# Patient Record
Sex: MALE | URBAN - METROPOLITAN AREA
[De-identification: names, ages, dates, MRNs, and addresses within clinical notes are randomized per-mention and may not be internally consistent; named-entity substitution may affect disease eponyms.]

---

## 2020-10-27 ENCOUNTER — NEW PATIENT (OUTPATIENT)
Dept: URBAN - METROPOLITAN AREA CLINIC 56 | Facility: CLINIC | Age: 80
End: 2020-10-27
Payer: MEDICARE

## 2020-10-27 DIAGNOSIS — H40.1210: ICD-10-CM

## 2020-10-27 DIAGNOSIS — H40.022 OPEN ANGLE WITH BORDERLINE FINDINGS, HIGH RISK, LEFT EYE: ICD-10-CM

## 2020-10-27 DIAGNOSIS — H40.1214 LOW-TENSION GLAUCOMA, RIGHT EYE, INDETERMINATE STAGE: ICD-10-CM

## 2020-10-27 PROCEDURE — 92250 FUNDUS PHOTOGRAPHY W/I&R: CPT | Performed by: OPTOMETRIST

## 2020-10-27 PROCEDURE — 92004 COMPRE OPH EXAM NEW PT 1/>: CPT | Performed by: OPTOMETRIST

## 2020-10-27 PROCEDURE — 92134 CPTRZ OPH DX IMG PST SGM RTA: CPT | Performed by: OPTOMETRIST

## 2020-10-27 ASSESSMENT — VISUAL ACUITY
OS: 20/40
OD: 20/70

## 2020-10-27 ASSESSMENT — KERATOMETRY
OS: 43.75
OD: 43.24

## 2020-10-27 ASSESSMENT — INTRAOCULAR PRESSURE
OD: 16
OS: 17

## 2020-12-02 ENCOUNTER — FOLLOW UP ESTABLISHED (OUTPATIENT)
Dept: URBAN - METROPOLITAN AREA CLINIC 56 | Facility: CLINIC | Age: 80
End: 2020-12-02
Payer: MEDICARE

## 2020-12-02 DIAGNOSIS — H25.813 COMBINED FORMS OF AGE-RELATED CATARACT, BILATERAL: ICD-10-CM

## 2020-12-02 DIAGNOSIS — H40.1234 LOW-TENSION GLAUCOMA, BILATERAL, INDETERMINATE STAGE: Primary | ICD-10-CM

## 2020-12-02 PROCEDURE — 92083 EXTENDED VISUAL FIELD XM: CPT | Performed by: OPTOMETRIST

## 2020-12-02 PROCEDURE — 92012 INTRM OPH EXAM EST PATIENT: CPT | Performed by: OPTOMETRIST

## 2020-12-02 PROCEDURE — 76514 ECHO EXAM OF EYE THICKNESS: CPT | Performed by: OPTOMETRIST

## 2020-12-02 RX ORDER — LATANOPROST 50 UG/ML
0.005 % SOLUTION OPHTHALMIC
Qty: 7.5 | Refills: 3 | Status: ACTIVE
Start: 2020-12-02

## 2020-12-02 ASSESSMENT — INTRAOCULAR PRESSURE
OS: 16
OD: 16

## 2021-01-15 ENCOUNTER — Encounter (OUTPATIENT)
Dept: URBAN - METROPOLITAN AREA CLINIC 56 | Facility: CLINIC | Age: 81
End: 2021-01-15
Payer: MEDICARE

## 2021-01-15 DIAGNOSIS — Z01.818 ENCOUNTER FOR OTHER PREPROCEDURAL EXAMINATION: Primary | ICD-10-CM

## 2021-01-15 PROCEDURE — 99203 OFFICE O/P NEW LOW 30 MIN: CPT | Performed by: PHYSICIAN ASSISTANT

## 2022-05-02 ENCOUNTER — OFFICE VISIT (OUTPATIENT)
Dept: URBAN - METROPOLITAN AREA CLINIC 56 | Facility: CLINIC | Age: 82
End: 2022-05-02
Payer: MEDICARE

## 2022-05-02 DIAGNOSIS — H25.813 COMBINED FORMS OF AGE-RELATED CATARACT, BILATERAL: Primary | ICD-10-CM

## 2022-05-02 DIAGNOSIS — H40.1234 LOW-TENSION GLAUCOMA, BILATERAL, INDETERMINATE STAGE: ICD-10-CM

## 2022-05-02 PROCEDURE — 99214 OFFICE O/P EST MOD 30 MIN: CPT | Performed by: STUDENT IN AN ORGANIZED HEALTH CARE EDUCATION/TRAINING PROGRAM

## 2022-05-02 PROCEDURE — 92133 CPTRZD OPH DX IMG PST SGM ON: CPT | Performed by: STUDENT IN AN ORGANIZED HEALTH CARE EDUCATION/TRAINING PROGRAM

## 2022-05-02 RX ORDER — TIMOLOL MALEATE 6.8 MG/ML
0.5 % SOLUTION/ DROPS OPHTHALMIC
Qty: 5 | Refills: 3 | Status: INACTIVE
Start: 2022-05-02 | End: 2022-05-02

## 2022-05-02 ASSESSMENT — KERATOMETRY
OS: 43.94
OD: 43.55

## 2022-05-02 ASSESSMENT — INTRAOCULAR PRESSURE
OS: 11
OD: 13

## 2022-05-02 NOTE — IMPRESSION/PLAN
Impression: Combined forms of age-related cataract, bilateral: H25.813. Plan: visually significant with expected improvement in vision with phaco/IOL. Discussed r/b/a of procedure. All surgical options discussed, including LensX vs conventional, and multifocal IOLs. Patient accepts full time glasses after surgery if conventional is elected, and glasses for NEAR if LensX/toric is elected. NO PANOPTIX D/T GLC. All questions answered. Refer patient for cataract pre-op. First Eye: OD Target: Distance Dilation: Good Habitual spec wear: (-) h/o trauma (-) h/o tamsulosin PT HAS DEMENTIA, BUT WAS ABLE TO SIT BEHIND SLIT LAMP AND FOLLOW COMMANDS. I BELIEVE HE WILL BE ABLE TO HAVE STANDARD PHACO, BUT DISCUSSED POSSBILITY OF SEDATED SX AT [facility].  NO HVF PRE OP D/T H/O POOR FIELDS

## 2022-05-02 NOTE — IMPRESSION/PLAN
Impression: Low-tension glaucoma, bilateral, indeterminate stage Pachs: [id number]/785 LRNFL (05/2022): inf thinning OD, full OS

HVF24-2 (12/2020) Plan: low IOP with Tonopen, difficult to check given pt compliance and deep set eyes. RNFL shows inf thinning OD only, full OS - possible vascular event but no retinal signs. Prev discussed phaco with MIGS to improve vision and compliance. Start Timolol QAM OD only RTC for pre op phaco with MIGS - HVF UNRELIABLE, NO NEED TO REPEPAT PRIOR TO PHACO.

## 2022-07-15 ENCOUNTER — ADULT PHYSICAL (OUTPATIENT)
Dept: URBAN - METROPOLITAN AREA CLINIC 56 | Facility: CLINIC | Age: 82
End: 2022-07-15
Payer: MEDICARE

## 2022-07-15 DIAGNOSIS — Z01.818 ENCOUNTER FOR OTHER PREPROCEDURAL EXAMINATION: Primary | ICD-10-CM

## 2022-07-15 DIAGNOSIS — H25.813 COMBINED FORMS OF AGE-RELATED CATARACT, BILATERAL: ICD-10-CM

## 2022-07-15 PROCEDURE — 99213 OFFICE O/P EST LOW 20 MIN: CPT | Performed by: PHYSICIAN ASSISTANT

## 2022-07-19 ENCOUNTER — OFFICE VISIT (OUTPATIENT)
Dept: URBAN - METROPOLITAN AREA CLINIC 56 | Facility: CLINIC | Age: 82
End: 2022-07-19
Payer: MEDICARE

## 2022-07-19 DIAGNOSIS — H25.813 COMBINED FORMS OF AGE-RELATED CATARACT, BILATERAL: Primary | ICD-10-CM

## 2022-07-19 DIAGNOSIS — H40.1134 PRIMARY OPEN-ANGLE GLAUCOMA, INDETERMINATE, BILATERAL: ICD-10-CM

## 2022-07-19 DIAGNOSIS — H52.203 BILATERAL ASTIGMATISM: ICD-10-CM

## 2022-07-19 DIAGNOSIS — H25.812 COMBINED FORMS OF AGE-RELATED CATARACT, LEFT EYE: ICD-10-CM

## 2022-07-19 PROCEDURE — 99204 OFFICE O/P NEW MOD 45 MIN: CPT | Performed by: OPHTHALMOLOGY

## 2022-07-19 PROCEDURE — 92020 GONIOSCOPY: CPT | Performed by: OPHTHALMOLOGY

## 2022-07-19 PROCEDURE — 92083 EXTENDED VISUAL FIELD XM: CPT | Performed by: OPHTHALMOLOGY

## 2022-07-19 RX ORDER — TIMOLOL MALEATE 5 MG/ML
0.5 % SOLUTION/ DROPS OPHTHALMIC
Qty: 5 | Refills: 5 | Status: ACTIVE
Start: 2022-07-19

## 2022-07-19 RX ORDER — KETOROLAC TROMETHAMINE 5 MG/ML
0.5 % SOLUTION OPHTHALMIC
Qty: 10 | Refills: 1 | Status: ACTIVE
Start: 2022-07-19

## 2022-07-19 RX ORDER — OFLOXACIN 3 MG/ML
0.3 % SOLUTION/ DROPS OPHTHALMIC
Qty: 5 | Refills: 1 | Status: ACTIVE
Start: 2022-07-19

## 2022-07-19 RX ORDER — PREDNISOLONE ACETATE 10 MG/ML
1 % SUSPENSION/ DROPS OPHTHALMIC
Qty: 10 | Refills: 1 | Status: ACTIVE
Start: 2022-07-19

## 2022-07-19 ASSESSMENT — KERATOMETRY
OD: 43.44
OS: 43.58

## 2022-07-19 ASSESSMENT — INTRAOCULAR PRESSURE
OD: 18
OS: 17

## 2022-07-19 ASSESSMENT — VISUAL ACUITY
OS: 20/30
OD: 20/60

## 2022-07-19 NOTE — IMPRESSION/PLAN
Impression: Combined forms of age-related cataract, bilateral: H25.813. Plan: See [address]: CE/Phaco/IOL/[place] Dual Blade Goniotomy OU, OD 1st eye, [[AIM: -0.25 OU; Standard Lens; NO LRI/ORA; No Trimoxi/Dexycu; Use Oflox/Prednisolone/Ket QID OU]] **WILL NEED EPISHUGARCAINE (tamsulosin)**
-ERx'd Ofloxacin QID, Prednisolone QID, and Ketorolac QID as requested. 
-Continue Glaucoma Drops: CHANGE Timolol QAM from OD to OU.

## 2022-07-19 NOTE — IMPRESSION/PLAN
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 Plan: Discussed cataract and glaucoma diagnosis in detail with patient. Phaco/IOL, [place] Dual Blade Goniotomy surgery was discussed in detail. Discussed risks, benefits, and expectations of cataract surgery. Patient also understands glasses will be necessary for sharpest vision after surgery. Although surgery is expected to improve the condition, the patient understands that there is a chance that the condition could worsen in the future. Patient advised that ongoing uncontrolled glaucoma may result in permanent vision loss. Discussed surgery in detail with patient. Post-Operative instructions reviewed with patient. Discussed activity restrictions including no bending head below waist, rubbing the eye, straining or lifting over 10 lbs, stay out of [place], dirty areas and shield at night for one week. All questions answered. Patient understands condition may limit possible outcome of surgery.

## 2022-07-19 NOTE — IMPRESSION/PLAN
Impression: Bilateral astigmatism: H52.203. Plan: No AMP. Patient understands condition may limit possible outcome of surgery.

## 2022-07-28 ENCOUNTER — SURGERY (OUTPATIENT)
Dept: URBAN - METROPOLITAN AREA SURGERY 19 | Facility: SURGERY | Age: 82
End: 2022-07-28
Payer: MEDICARE

## 2022-07-28 DIAGNOSIS — H25.813 COMBINED FORMS OF AGE-RELATED CATARACT, BILATERAL: Primary | ICD-10-CM

## 2022-07-28 DIAGNOSIS — H40.1134 PRIMARY OPEN-ANGLE GLAUCOMA, BILATERAL, INDETERMINATE STAGE: ICD-10-CM

## 2022-07-29 ENCOUNTER — POST-OPERATIVE VISIT (OUTPATIENT)
Dept: URBAN - METROPOLITAN AREA CLINIC 56 | Facility: CLINIC | Age: 82
End: 2022-07-29
Payer: MEDICARE

## 2022-07-29 DIAGNOSIS — Z48.810 ENCOUNTER FOR SURGICAL AFTERCARE FOLLOWING SURGERY ON A SENSE ORGAN: Primary | ICD-10-CM

## 2022-07-29 PROCEDURE — 99024 POSTOP FOLLOW-UP VISIT: CPT | Performed by: STUDENT IN AN ORGANIZED HEALTH CARE EDUCATION/TRAINING PROGRAM

## 2022-07-29 ASSESSMENT — INTRAOCULAR PRESSURE: OD: 17

## 2022-07-29 NOTE — IMPRESSION/PLAN
Impression: S/P Cataract Extraction by phacoemulsification with IOL placement; Goniotomy OD - 1 Day. Encounter for surgical aftercare following surgery on a sense organ  Z48.810. Excellent post op course   Post operative instructions reviewed - Plan: good IOP. Restrictions discussed. Call with worsening pain or vision.

## 2022-08-02 ENCOUNTER — POST-OPERATIVE VISIT (OUTPATIENT)
Dept: URBAN - METROPOLITAN AREA CLINIC 56 | Facility: CLINIC | Age: 82
End: 2022-08-02
Payer: MEDICARE

## 2022-08-02 DIAGNOSIS — H52.203 BILATERAL ASTIGMATISM: ICD-10-CM

## 2022-08-02 DIAGNOSIS — Z48.810 ENCOUNTER FOR SURGICAL AFTERCARE FOLLOWING SURGERY ON A SENSE ORGAN: Primary | ICD-10-CM

## 2022-08-02 DIAGNOSIS — H40.1134 PRIMARY OPEN-ANGLE GLAUCOMA, INDETERMINATE, BILATERAL: ICD-10-CM

## 2022-08-02 DIAGNOSIS — H25.812 COMBINED FORMS OF AGE-RELATED CATARACT, LEFT EYE: ICD-10-CM

## 2022-08-02 PROCEDURE — 99024 POSTOP FOLLOW-UP VISIT: CPT | Performed by: OPHTHALMOLOGY

## 2022-08-02 ASSESSMENT — INTRAOCULAR PRESSURE
OS: 18
OD: 16

## 2022-08-02 ASSESSMENT — VISUAL ACUITY: OD: 20/80

## 2022-08-02 NOTE — IMPRESSION/PLAN
Impression: Primary open-angle glaucoma, indeterminate, bilateral: H40.0418. Plan: Discussed cataract and glaucoma diagnosis in detail with patient. Phaco/IOL, [place] Dual Blade Goniotomy surgery for the left eye, was discussed in detail. Discussed risks, benefits, and expectations of cataract surgery. Patient also understands glasses will be necessary for sharpest vision after surgery. Although surgery is expected to improve the condition, the patient understands that there is a chance that the condition could worsen in the future. Patient advised that ongoing uncontrolled glaucoma may result in permanent vision loss. Discussed surgery in detail with patient. Post-Operative instructions reviewed with patient. Discussed activity restrictions including no bending head below waist, rubbing the eye, straining or lifting over 10 lbs, stay out of [place], dirty areas and shield at night for one week. All questions answered. Patient understands condition may limit possible outcome of surgery.

## 2022-08-02 NOTE — IMPRESSION/PLAN
Impression:  Encounter for surgical aftercare following surgery on a sense organ  Z48.810. Plan: POW1 Phaco/IOL OD Difficult to perform exam. Discussed diagnosis and treatment options in detail. Patient may proceed with second eye cataract surgery based on complaint today. Surgical risks and complications were reviewed today and all questions were answered. Patient reminded of need for glasses for best vision after cataract surgery. Recommend patient continue not rubbing surgical eye for 1 month, no swimming or eye makeup for a total of two weeks. Patient advised to find plastic shield; wear for one week when sleep. Patient wishes to proceed with surgery of the second eye.
-Drop schedule: Stop Ofloxacin. Continue Ketorolac QID OD [id number] from surgery. Begin taper of Prednisolone OD to TID x1week then BID x1week then QD x1week then stop.

## 2022-08-02 NOTE — IMPRESSION/PLAN
Impression: Combined forms of age-related cataract, left eye: H25.812. Plan: Patient wishes to proceed with second eye. See [address]: CE/Phaco/IOL/[place] Dual Blade Goniotomy OS 2nd eye, [[AIM: -0.25 OS; Standard Lens; NO LRI/ORA OS; No Trimoxi/Dexycu; Use Oflox/Prednisolone/Ket QID OS]] **WILL NEED EPISHUGARCAINE (tamsulosin)**

-Continue Glaucoma Drops: CHANGE Timolol QAM from OD to OU.

## 2022-08-11 ENCOUNTER — SURGERY (OUTPATIENT)
Dept: URBAN - METROPOLITAN AREA SURGERY 19 | Facility: SURGERY | Age: 82
End: 2022-08-11
Payer: MEDICARE

## 2022-08-11 DIAGNOSIS — H25.812 COMBINED FORMS OF AGE-RELATED CATARACT, LEFT EYE: Primary | ICD-10-CM

## 2022-08-11 DIAGNOSIS — H40.1134 PRIMARY OPEN-ANGLE GLAUCOMA, BILATERAL, INDETERMINATE STAGE: ICD-10-CM

## 2022-08-12 ENCOUNTER — POST-OPERATIVE VISIT (OUTPATIENT)
Dept: URBAN - METROPOLITAN AREA CLINIC 56 | Facility: CLINIC | Age: 82
End: 2022-08-12
Payer: MEDICARE

## 2022-08-12 DIAGNOSIS — Z96.1 PRESENCE OF INTRAOCULAR LENS: Primary | ICD-10-CM

## 2022-08-12 PROCEDURE — 99024 POSTOP FOLLOW-UP VISIT: CPT | Performed by: STUDENT IN AN ORGANIZED HEALTH CARE EDUCATION/TRAINING PROGRAM

## 2022-08-12 ASSESSMENT — INTRAOCULAR PRESSURE: OS: 17

## 2022-08-12 NOTE — IMPRESSION/PLAN
Impression: S/P Cataract Extraction by phacoemulsification with IOL placement; Goniotomy OS - 1 Day. Presence of intraocular lens  Z96.1. Excellent post op course   Post operative instructions reviewed - Plan: good IOP. Restrictions discussed. Call with worsening pain or vision. 

Continue timolol QAM OU

## 2022-09-01 ENCOUNTER — POST-OPERATIVE VISIT (OUTPATIENT)
Dept: URBAN - METROPOLITAN AREA CLINIC 56 | Facility: CLINIC | Age: 82
End: 2022-09-01
Payer: MEDICARE

## 2022-09-01 DIAGNOSIS — Z96.1 PRESENCE OF INTRAOCULAR LENS: Primary | ICD-10-CM

## 2022-09-01 PROCEDURE — 92134 CPTRZ OPH DX IMG PST SGM RTA: CPT | Performed by: STUDENT IN AN ORGANIZED HEALTH CARE EDUCATION/TRAINING PROGRAM

## 2022-09-01 PROCEDURE — 99024 POSTOP FOLLOW-UP VISIT: CPT | Performed by: STUDENT IN AN ORGANIZED HEALTH CARE EDUCATION/TRAINING PROGRAM

## 2022-09-01 ASSESSMENT — VISUAL ACUITY
OS: 20/40
OD: 20/50

## 2022-09-01 ASSESSMENT — INTRAOCULAR PRESSURE
OS: 17
OD: 17

## 2022-09-01 NOTE — IMPRESSION/PLAN
Impression: S/P Cataract Extraction by phacoemulsification with IOL placement; Goniotomy OS - 21 Days. Presence of intraocular lens  Z96.1. Excellent post op course   Post operative instructions reviewed - Plan: good IOP, no infection. Restrictions lifted. Call with worsening pain or vision. Discussed vision limitations - offered low vision consult if pt interested in updated spec Rx Cont timolol QAM OU

RTC in 6mo for IOP only.